# Patient Record
Sex: FEMALE | Race: WHITE | NOT HISPANIC OR LATINO | Employment: OTHER | URBAN - NONMETROPOLITAN AREA
[De-identification: names, ages, dates, MRNs, and addresses within clinical notes are randomized per-mention and may not be internally consistent; named-entity substitution may affect disease eponyms.]

---

## 2018-02-07 ENCOUNTER — HISTORICAL (OUTPATIENT)
Dept: ADMINISTRATIVE | Facility: HOSPITAL | Age: 73
End: 2018-02-07

## 2019-04-02 ENCOUNTER — HISTORICAL (OUTPATIENT)
Dept: ADMINISTRATIVE | Facility: HOSPITAL | Age: 74
End: 2019-04-02

## 2021-03-24 ENCOUNTER — HISTORICAL (OUTPATIENT)
Dept: ADMINISTRATIVE | Facility: HOSPITAL | Age: 76
End: 2021-03-24

## 2022-10-04 ENCOUNTER — HISTORICAL (OUTPATIENT)
Dept: ADMINISTRATIVE | Facility: HOSPITAL | Age: 77
End: 2022-10-04

## 2023-02-07 ENCOUNTER — OFFICE VISIT (OUTPATIENT)
Dept: FAMILY MEDICINE | Facility: CLINIC | Age: 78
End: 2023-02-07
Payer: MEDICARE

## 2023-02-07 VITALS
OXYGEN SATURATION: 98 % | SYSTOLIC BLOOD PRESSURE: 120 MMHG | HEART RATE: 72 BPM | DIASTOLIC BLOOD PRESSURE: 68 MMHG | WEIGHT: 229.5 LBS | TEMPERATURE: 98 F | BODY MASS INDEX: 46.27 KG/M2 | HEIGHT: 59 IN

## 2023-02-07 DIAGNOSIS — G62.9 NEUROPATHY: ICD-10-CM

## 2023-02-07 DIAGNOSIS — E66.01 MORBID OBESITY WITH BMI OF 45.0-49.9, ADULT: ICD-10-CM

## 2023-02-07 DIAGNOSIS — R60.0 LOWER EXTREMITY EDEMA: ICD-10-CM

## 2023-02-07 DIAGNOSIS — E78.5 HYPERLIPIDEMIA, UNSPECIFIED HYPERLIPIDEMIA TYPE: ICD-10-CM

## 2023-02-07 DIAGNOSIS — E66.9 DIABETES MELLITUS TYPE 2 IN OBESE: ICD-10-CM

## 2023-02-07 DIAGNOSIS — Z76.89 ENCOUNTER TO ESTABLISH CARE: Primary | ICD-10-CM

## 2023-02-07 DIAGNOSIS — I10 PRIMARY HYPERTENSION: ICD-10-CM

## 2023-02-07 DIAGNOSIS — F33.1 MODERATE EPISODE OF RECURRENT MAJOR DEPRESSIVE DISORDER: ICD-10-CM

## 2023-02-07 DIAGNOSIS — E11.69 DIABETES MELLITUS TYPE 2 IN OBESE: ICD-10-CM

## 2023-02-07 PROBLEM — Z13.29 SCREENING FOR THYROID DISORDER: Status: ACTIVE | Noted: 2023-02-07

## 2023-02-07 PROBLEM — Z13.220 SCREENING CHOLESTEROL LEVEL: Status: ACTIVE | Noted: 2023-02-07

## 2023-02-07 PROCEDURE — 3078F DIAST BP <80 MM HG: CPT | Mod: ,,, | Performed by: NURSE PRACTITIONER

## 2023-02-07 PROCEDURE — 99204 OFFICE O/P NEW MOD 45 MIN: CPT | Mod: ,,, | Performed by: NURSE PRACTITIONER

## 2023-02-07 PROCEDURE — 3074F SYST BP LT 130 MM HG: CPT | Mod: ,,, | Performed by: NURSE PRACTITIONER

## 2023-02-07 PROCEDURE — 1101F PR PT FALLS ASSESS DOC 0-1 FALLS W/OUT INJ PAST YR: ICD-10-PCS | Mod: ,,, | Performed by: NURSE PRACTITIONER

## 2023-02-07 PROCEDURE — 1160F PR REVIEW ALL MEDS BY PRESCRIBER/CLIN PHARMACIST DOCUMENTED: ICD-10-PCS | Mod: ,,, | Performed by: NURSE PRACTITIONER

## 2023-02-07 PROCEDURE — 3288F FALL RISK ASSESSMENT DOCD: CPT | Mod: ,,, | Performed by: NURSE PRACTITIONER

## 2023-02-07 PROCEDURE — 1159F MED LIST DOCD IN RCRD: CPT | Mod: ,,, | Performed by: NURSE PRACTITIONER

## 2023-02-07 PROCEDURE — 1160F RVW MEDS BY RX/DR IN RCRD: CPT | Mod: ,,, | Performed by: NURSE PRACTITIONER

## 2023-02-07 PROCEDURE — 3074F PR MOST RECENT SYSTOLIC BLOOD PRESSURE < 130 MM HG: ICD-10-PCS | Mod: ,,, | Performed by: NURSE PRACTITIONER

## 2023-02-07 PROCEDURE — 99204 PR OFFICE/OUTPT VISIT, NEW, LEVL IV, 45-59 MIN: ICD-10-PCS | Mod: ,,, | Performed by: NURSE PRACTITIONER

## 2023-02-07 PROCEDURE — 3288F PR FALLS RISK ASSESSMENT DOCUMENTED: ICD-10-PCS | Mod: ,,, | Performed by: NURSE PRACTITIONER

## 2023-02-07 PROCEDURE — 3078F PR MOST RECENT DIASTOLIC BLOOD PRESSURE < 80 MM HG: ICD-10-PCS | Mod: ,,, | Performed by: NURSE PRACTITIONER

## 2023-02-07 PROCEDURE — 1159F PR MEDICATION LIST DOCUMENTED IN MEDICAL RECORD: ICD-10-PCS | Mod: ,,, | Performed by: NURSE PRACTITIONER

## 2023-02-07 PROCEDURE — 1101F PT FALLS ASSESS-DOCD LE1/YR: CPT | Mod: ,,, | Performed by: NURSE PRACTITIONER

## 2023-02-07 RX ORDER — DULOXETIN HYDROCHLORIDE 60 MG/1
60 CAPSULE, DELAYED RELEASE ORAL DAILY
Qty: 90 CAPSULE | Refills: 3 | Status: SHIPPED | OUTPATIENT
Start: 2023-02-07 | End: 2023-05-08

## 2023-02-07 RX ORDER — METFORMIN HYDROCHLORIDE 1000 MG/1
1000 TABLET ORAL 2 TIMES DAILY
Qty: 180 TABLET | Refills: 3 | Status: SHIPPED | OUTPATIENT
Start: 2023-02-07 | End: 2023-05-08

## 2023-02-07 RX ORDER — GABAPENTIN 300 MG/1
300 CAPSULE ORAL DAILY
Qty: 30 CAPSULE | Refills: 3 | Status: SHIPPED | OUTPATIENT
Start: 2023-02-07

## 2023-02-07 RX ORDER — BUMETANIDE 1 MG/1
1 TABLET ORAL DAILY
COMMUNITY
Start: 2022-12-29 | End: 2023-02-07 | Stop reason: SDUPTHER

## 2023-02-07 RX ORDER — DULOXETIN HYDROCHLORIDE 60 MG/1
60 CAPSULE, DELAYED RELEASE ORAL DAILY
COMMUNITY
Start: 2022-12-29 | End: 2023-02-07 | Stop reason: SDUPTHER

## 2023-02-07 RX ORDER — HUMAN INSULIN 100 [USP'U]/ML
20 INJECTION, SUSPENSION SUBCUTANEOUS 2 TIMES DAILY
COMMUNITY
Start: 2022-12-27 | End: 2023-02-24 | Stop reason: SDUPTHER

## 2023-02-07 RX ORDER — AMLODIPINE BESYLATE 5 MG/1
5 TABLET ORAL DAILY
COMMUNITY
Start: 2022-12-27 | End: 2023-02-07 | Stop reason: SDUPTHER

## 2023-02-07 RX ORDER — CARVEDILOL 6.25 MG/1
6.25 TABLET ORAL 2 TIMES DAILY
COMMUNITY
Start: 2022-12-29 | End: 2023-02-07 | Stop reason: SDUPTHER

## 2023-02-07 RX ORDER — NAPROXEN SODIUM 220 MG/1
81 TABLET, FILM COATED ORAL DAILY
COMMUNITY

## 2023-02-07 RX ORDER — GABAPENTIN 300 MG/1
300 CAPSULE ORAL DAILY
COMMUNITY
Start: 2022-12-30 | End: 2023-02-07 | Stop reason: SDUPTHER

## 2023-02-07 RX ORDER — AMLODIPINE BESYLATE 5 MG/1
5 TABLET ORAL DAILY
Qty: 90 TABLET | Refills: 3 | Status: SHIPPED | OUTPATIENT
Start: 2023-02-07 | End: 2023-05-08

## 2023-02-07 RX ORDER — BUMETANIDE 1 MG/1
1 TABLET ORAL DAILY
Qty: 90 TABLET | Refills: 3 | Status: SHIPPED | OUTPATIENT
Start: 2023-02-07 | End: 2023-05-08

## 2023-02-07 RX ORDER — CARVEDILOL 6.25 MG/1
6.25 TABLET ORAL 2 TIMES DAILY
Qty: 180 TABLET | Refills: 3 | Status: SHIPPED | OUTPATIENT
Start: 2023-02-07 | End: 2023-05-08

## 2023-02-07 RX ORDER — METFORMIN HYDROCHLORIDE 1000 MG/1
1000 TABLET ORAL 2 TIMES DAILY
COMMUNITY
Start: 2022-12-27 | End: 2023-02-07 | Stop reason: SDUPTHER

## 2023-02-07 NOTE — PROGRESS NOTES
Patient ID: Princess Florentino is a 77 y.o. female.    Chief Complaint: Establish Care                     History of Present Illness:  The patient is 77 y.o. White female for evaluation and management with a chief complaint of Establish Care .  Previously followed by Dr. Ardon.  Diagnosed with type 2 diabetes at age 58.  She reports that her most recent hemoglobin A1c was 7.5 about 3 months ago.  Her fasting blood sugars average .  She also complains of neuropathic pain to bilateral feet and hands managed with gabapentin daily.    Diagnosed with major depressive disorder about 5-6 years ago.  She is been on certain in the past but no complaints today on duloxetine 60 mg daily.  She has also taken sertraline in the past.  Sleeping well.    She was also diagnosed with hypertension at age 58.  She was on ACE inhibitors in the past but these caused angioedema.  She does not check her blood pressures at home but reports they are well controlled at office visits.  She was also on a statin in the past for hyperlipidemia.  She is unsure of exactly why it was stopped but thinks it was secondary to constipation.  She takes Bumex as needed for lower extremity edema.  Previously followed by Dr. Diaz on and Dr. Stroud for Cardiology but no current cardiologist.  She reports that congestive heart failure was considered but echocardiogram was normal.    Lives at home alone.  No recent falls.      Review of Systems   Constitutional:  Negative for activity change, appetite change, fatigue and fever.   HENT:  Negative for ear pain, hearing loss and trouble swallowing.    Eyes:  Negative for pain and visual disturbance.   Respiratory:  Negative for cough, chest tightness and shortness of breath.    Cardiovascular:  Positive for leg swelling. Negative for chest pain, palpitations and claudication.   Gastrointestinal:  Negative for abdominal pain, blood in stool, change in bowel habit, constipation, diarrhea, nausea, vomiting and  "change in bowel habit.   Endocrine: Negative for cold intolerance, heat intolerance, polydipsia, polyphagia and polyuria.   Genitourinary:  Negative for dysuria, frequency and hematuria.   Musculoskeletal:  Negative for gait problem and joint swelling.   Integumentary:  Negative for rash and mole/lesion.   Allergic/Immunologic: Negative for immunocompromised state.   Neurological:  Negative for dizziness, seizures, weakness, headaches and memory loss.   Psychiatric/Behavioral:  Negative for confusion and sleep disturbance. The patient is not nervous/anxious.        Past Medical History:  has a past medical history of Arthritis, Depression, Diabetes mellitus, type 2, Heart failure, unspecified, and Hypertension.    Current Outpatient Medications   Medication Instructions    amLODIPine (NORVASC) 5 mg, Oral, Daily    aspirin 81 mg, Oral, Daily    bumetanide (BUMEX) 1 mg, Oral, Daily    carvediloL (COREG) 6.25 mg, Oral, 2 times daily    DULoxetine (CYMBALTA) 60 mg, Oral, Daily    gabapentin (NEURONTIN) 300 mg, Oral, Daily    metFORMIN (GLUCOPHAGE) 1,000 mg, Oral, 2 times daily    NOVOLIN 70/30 U-100 INSULIN 100 unit/mL (70-30) injection 10 mLs, Subcutaneous, 2 times daily       Patient is allergic to ace inhibitors, benazepril, duricef [cefadroxil], iodine, omnipen, penicillins, and vistaril [hydroxyzine hcl].       Visit Vitals  /68 (BP Location: Right arm)   Pulse 72   Temp 98.1 °F (36.7 °C) (Oral)   Ht 4' 11.06" (1.5 m)   Wt 104.1 kg (229 lb 8 oz)   SpO2 98%   BMI 46.27 kg/m²         Physical Exam  Constitutional:       Appearance: Normal appearance. She is obese.   HENT:      Head: Normocephalic.      Right Ear: External ear normal.      Left Ear: External ear normal.      Mouth/Throat:      Mouth: Mucous membranes are moist.      Pharynx: Oropharynx is clear.   Eyes:      General: No scleral icterus.     Extraocular Movements: Extraocular movements intact.      Conjunctiva/sclera: Conjunctivae normal.      " Pupils: Pupils are equal, round, and reactive to light.   Cardiovascular:      Rate and Rhythm: Normal rate and regular rhythm.      Heart sounds: Normal heart sounds.      Comments: Nonpitting edema to bilateral lower extremities   Pulmonary:      Effort: Pulmonary effort is normal.      Breath sounds: Normal breath sounds.   Abdominal:      General: Bowel sounds are normal.      Palpations: Abdomen is soft.   Musculoskeletal:         General: Normal range of motion.      Cervical back: Normal range of motion and neck supple.   Skin:     General: Skin is warm and dry.   Neurological:      Mental Status: She is alert and oriented to person, place, and time. Mental status is at baseline.   Psychiatric:         Mood and Affect: Mood normal.         Thought Content: Thought content normal.         Judgment: Judgment normal.         Assessment/Plan:    ICD-10-CM ICD-9-CM   1. Encounter to establish care  Z76.89 V65.8   2. Diabetes mellitus type 2 in obese  E11.69 250.00    E66.9 278.00   3. Primary hypertension  I10 401.9   4. Morbid obesity with BMI of 45.0-49.9, adult  E66.01 278.01    Z68.42 V85.42   5. Hyperlipidemia, unspecified hyperlipidemia type  E78.5 272.4   6. Lower extremity edema  R60.0 782.3   7. Neuropathy  G62.9 355.9   8. Moderate episode of recurrent major depressive disorder  F33.1 296.32   9. Screening for thyroid disorder  Z13.29 V77.0       1. Encounter to establish care  Obtain records from Dr. Ardon, release signed    2. Diabetes mellitus type 2 in obese  Continue metformin a 1000 mg twice per day and Novolin 30 units in the morning and 20 units in the evening    - Hemoglobin A1C; Future  - TSH; Future    3. Primary hypertension  Well controlled today   Continue amlodipine 5 mg daily    - CBC Auto Differential; Future  - Comprehensive Metabolic Panel; Future    4. Morbid obesity with BMI of 45.0-49.9, adult  Discussed initiating GLP 1 at next visit after labs are complete and reviewed    5.  Hyperlipidemia, unspecified hyperlipidemia type  Obtain FLP today  She reports constipation with statins in the past    - Lipid Panel; Future    6. Lower extremity edema  Obtain records from PCP and recent echocardiogram   Low-sodium diet, elevation of extremities, and continue Bumex 1 mg as needed    7. Neuropathy  Refill gabapentin    8. Moderate episode of recurrent major depressive disorder  No complaints at this time   Continue duloxetine 60 mg daily          Follow up in about 4 weeks (around 3/7/2023) for lab f/u, With Fasting Labs Prior. or sooner as needed.    Future Appointments   Date Time Provider Department Center   2/28/2023 10:00 AM LAB, [facility] LABORATORY DRAW [facility] KEO Ricks   3/7/2023 10:00 AM VICKIE Monzon FNP

## 2023-02-22 ENCOUNTER — DOCUMENTATION ONLY (OUTPATIENT)
Dept: FAMILY MEDICINE | Facility: CLINIC | Age: 78
End: 2023-02-22
Payer: MEDICARE

## 2023-02-22 LAB — CRC RECOMMENDATION EXT: NORMAL

## 2023-02-24 ENCOUNTER — PATIENT MESSAGE (OUTPATIENT)
Dept: FAMILY MEDICINE | Facility: CLINIC | Age: 78
End: 2023-02-24
Payer: MEDICARE

## 2023-02-24 ENCOUNTER — TELEPHONE (OUTPATIENT)
Dept: FAMILY MEDICINE | Facility: CLINIC | Age: 78
End: 2023-02-24
Payer: MEDICARE

## 2023-02-24 DIAGNOSIS — E66.9 DIABETES MELLITUS TYPE 2 IN OBESE: Primary | ICD-10-CM

## 2023-02-24 DIAGNOSIS — E11.69 DIABETES MELLITUS TYPE 2 IN OBESE: Primary | ICD-10-CM

## 2023-02-24 RX ORDER — HUMAN INSULIN 100 [USP'U]/ML
INJECTION, SUSPENSION SUBCUTANEOUS
Qty: 20 ML | Refills: 11 | Status: SHIPPED | OUTPATIENT
Start: 2023-02-24

## 2023-02-28 PROCEDURE — 80061 LIPID PANEL: CPT | Performed by: NURSE PRACTITIONER

## 2023-02-28 PROCEDURE — 84443 ASSAY THYROID STIM HORMONE: CPT | Performed by: NURSE PRACTITIONER

## 2023-02-28 PROCEDURE — 80053 COMPREHEN METABOLIC PANEL: CPT | Performed by: NURSE PRACTITIONER

## 2023-02-28 PROCEDURE — 83036 HEMOGLOBIN GLYCOSYLATED A1C: CPT | Performed by: NURSE PRACTITIONER

## 2023-02-28 PROCEDURE — 85025 COMPLETE CBC W/AUTO DIFF WBC: CPT | Performed by: NURSE PRACTITIONER

## 2023-03-07 ENCOUNTER — OFFICE VISIT (OUTPATIENT)
Dept: FAMILY MEDICINE | Facility: CLINIC | Age: 78
End: 2023-03-07
Payer: MEDICARE

## 2023-03-07 VITALS
HEIGHT: 59 IN | WEIGHT: 228.81 LBS | OXYGEN SATURATION: 99 % | SYSTOLIC BLOOD PRESSURE: 120 MMHG | BODY MASS INDEX: 46.13 KG/M2 | DIASTOLIC BLOOD PRESSURE: 72 MMHG | HEART RATE: 66 BPM | TEMPERATURE: 98 F

## 2023-03-07 DIAGNOSIS — G47.19 DAYTIME HYPERSOMNOLENCE: ICD-10-CM

## 2023-03-07 DIAGNOSIS — E66.9 DIABETES MELLITUS TYPE 2 IN OBESE: Primary | ICD-10-CM

## 2023-03-07 DIAGNOSIS — I10 PRIMARY HYPERTENSION: ICD-10-CM

## 2023-03-07 DIAGNOSIS — E78.5 HYPERLIPIDEMIA, UNSPECIFIED HYPERLIPIDEMIA TYPE: ICD-10-CM

## 2023-03-07 DIAGNOSIS — E11.69 DIABETES MELLITUS TYPE 2 IN OBESE: Primary | ICD-10-CM

## 2023-03-07 DIAGNOSIS — E66.01 MORBID OBESITY DUE TO EXCESS CALORIES: ICD-10-CM

## 2023-03-07 DIAGNOSIS — I25.10 CORONARY ARTERY DISEASE INVOLVING NATIVE HEART, UNSPECIFIED VESSEL OR LESION TYPE, UNSPECIFIED WHETHER ANGINA PRESENT: ICD-10-CM

## 2023-03-07 DIAGNOSIS — G47.33 OBSTRUCTIVE SLEEP APNEA: ICD-10-CM

## 2023-03-07 PROBLEM — G62.9 POLYNEUROPATHY: Status: ACTIVE | Noted: 2023-03-07

## 2023-03-07 PROBLEM — F33.0 MILD EPISODE OF RECURRENT MAJOR DEPRESSIVE DISORDER: Status: ACTIVE | Noted: 2023-03-07

## 2023-03-07 PROBLEM — Z13.220 SCREENING CHOLESTEROL LEVEL: Status: RESOLVED | Noted: 2023-02-07 | Resolved: 2023-03-07

## 2023-03-07 PROCEDURE — 99214 PR OFFICE/OUTPT VISIT, EST, LEVL IV, 30-39 MIN: ICD-10-PCS | Mod: ,,, | Performed by: NURSE PRACTITIONER

## 2023-03-07 PROCEDURE — 1160F PR REVIEW ALL MEDS BY PRESCRIBER/CLIN PHARMACIST DOCUMENTED: ICD-10-PCS | Mod: ,,, | Performed by: NURSE PRACTITIONER

## 2023-03-07 PROCEDURE — 1160F RVW MEDS BY RX/DR IN RCRD: CPT | Mod: ,,, | Performed by: NURSE PRACTITIONER

## 2023-03-07 PROCEDURE — 3078F PR MOST RECENT DIASTOLIC BLOOD PRESSURE < 80 MM HG: ICD-10-PCS | Mod: ,,, | Performed by: NURSE PRACTITIONER

## 2023-03-07 PROCEDURE — 1159F PR MEDICATION LIST DOCUMENTED IN MEDICAL RECORD: ICD-10-PCS | Mod: ,,, | Performed by: NURSE PRACTITIONER

## 2023-03-07 PROCEDURE — 3078F DIAST BP <80 MM HG: CPT | Mod: ,,, | Performed by: NURSE PRACTITIONER

## 2023-03-07 PROCEDURE — 1101F PT FALLS ASSESS-DOCD LE1/YR: CPT | Mod: ,,, | Performed by: NURSE PRACTITIONER

## 2023-03-07 PROCEDURE — 3074F PR MOST RECENT SYSTOLIC BLOOD PRESSURE < 130 MM HG: ICD-10-PCS | Mod: ,,, | Performed by: NURSE PRACTITIONER

## 2023-03-07 PROCEDURE — 3288F FALL RISK ASSESSMENT DOCD: CPT | Mod: ,,, | Performed by: NURSE PRACTITIONER

## 2023-03-07 PROCEDURE — 99214 OFFICE O/P EST MOD 30 MIN: CPT | Mod: ,,, | Performed by: NURSE PRACTITIONER

## 2023-03-07 PROCEDURE — 1101F PR PT FALLS ASSESS DOC 0-1 FALLS W/OUT INJ PAST YR: ICD-10-PCS | Mod: ,,, | Performed by: NURSE PRACTITIONER

## 2023-03-07 PROCEDURE — 3074F SYST BP LT 130 MM HG: CPT | Mod: ,,, | Performed by: NURSE PRACTITIONER

## 2023-03-07 PROCEDURE — 3288F PR FALLS RISK ASSESSMENT DOCUMENTED: ICD-10-PCS | Mod: ,,, | Performed by: NURSE PRACTITIONER

## 2023-03-07 PROCEDURE — 1159F MED LIST DOCD IN RCRD: CPT | Mod: ,,, | Performed by: NURSE PRACTITIONER

## 2023-03-07 RX ORDER — ROSUVASTATIN CALCIUM 5 MG/1
5 TABLET, COATED ORAL EVERY OTHER DAY
Qty: 45 TABLET | Refills: 3 | Status: SHIPPED | OUTPATIENT
Start: 2023-03-07 | End: 2024-03-06

## 2023-03-07 NOTE — PROGRESS NOTES
"Patient ID: Princess Florentino is a 77 y.o. female.    Chief Complaint: Hypertension, Depression, Diabetes, Arthritis, blood work , and Follow-up                     History of Present Illness:  The patient is 77 y.o. White female for evaluation and management with a chief complaint of Hypertension, Depression, Diabetes, Arthritis, blood work , and Follow-up .  Fasting blood sugars around 150 but little elevated closer to 200 after running out of her insulin for the past 5 days.  She previously established with Dr. Diaz in [place] for cardiology.  She has not seen him in the past few years due to transportation issues.  She reports 40% blockage "across my heart." She complains of intermittent pain to the left side of her chest.  May occur with activity or at rest.  Pain usually present for about 15 minutes and then resolves without intervention.  She recently began to experience palpitations with exertion.  Palpitations usually improve with rest.  Occur a few times per week.  She also reports questionable history of congestive heart failure with lower extremity edema.  She takes Bumex as needed for the edema with some improvement.  Diagnosed with obstructive sleep apnea several years ago but is no longer using CPAP.  She is very fatigued and sluggish during the day.  Falls asleep several times during the day.     ROS: See HPI      Past Medical History:  has a past medical history of Arthritis, Depression, Diabetes mellitus, type 2, and Hypertension.    Current Outpatient Medications   Medication Instructions    amLODIPine (NORVASC) 5 mg, Oral, Daily    aspirin 81 mg, Oral, Daily    bumetanide (BUMEX) 1 mg, Oral, Daily    carvediloL (COREG) 6.25 mg, Oral, 2 times daily    DULoxetine (CYMBALTA) 60 mg, Oral, Daily    empagliflozin (JARDIANCE) 10 mg, Oral, Daily    gabapentin (NEURONTIN) 300 mg, Oral, Daily    metFORMIN (GLUCOPHAGE) 1,000 mg, Oral, 2 times daily    NOVOLIN 70/30 U-100 INSULIN 100 unit/mL (70-30) " "injection Inject 30 Units into the skin once daily AND 20 Units every evening.    rosuvastatin (CRESTOR) 5 mg, Oral, Every other day       Patient is allergic to ace inhibitors, benazepril, duricef [cefadroxil], iodine, omnipen, penicillins, and vistaril [hydroxyzine hcl].       Visit Vitals  /72 (BP Location: Right arm)   Pulse 66   Temp 98.1 °F (36.7 °C) (Temporal)   Ht 4' 11.06" (1.5 m)   Wt 103.8 kg (228 lb 12.8 oz)   SpO2 99%   BMI 46.12 kg/m²         Physical Exam  Vitals reviewed.   Constitutional:       Appearance: Normal appearance. She is obese.   Cardiovascular:      Rate and Rhythm: Normal rate and regular rhythm.      Heart sounds: Murmur (Systolic murmur) heard.   Pulmonary:      Effort: Pulmonary effort is normal.      Breath sounds: Normal breath sounds.   Abdominal:      General: Bowel sounds are normal.      Palpations: Abdomen is soft.      Tenderness: There is no abdominal tenderness.   Musculoskeletal:      Cervical back: Normal range of motion and neck supple.      Right lower leg: Edema present.      Left lower leg: Edema present.   Skin:     General: Skin is warm and dry.   Neurological:      Mental Status: She is alert.   Psychiatric:         Mood and Affect: Mood normal.         Behavior: Behavior normal.         Thought Content: Thought content normal.         Judgment: Judgment normal.         1. Diabetes mellitus type 2 in obese  Hemoglobin A1c 7.5   Continue metformin a 1000 mg daily, decreased 70 30 insulin to 15 units in the morning and 10 units in the evening   Add Jardiance 10 mg daily  Instructed to report any issues with hypoglycemia to office  Check blood sugar twice a day, record, and bring log to next visit  On Statin according to guidelines.  Follow ADA Diet. Avoid soda, simple sweets, and limit rice/pasta/breads/starches (no more than 45-50 grams per meal).  Maintain healthy weight with goal BMI <30.  Exercise 5 times per week for 30 minutes per day.  Stressed " importance of daily foot exams.  Educated on importance of annual dilated eye exam.    2. Primary hypertension  Well controlled today   BUN 12, creatinine 0.63, EGFR 90  Continue amlodipine 5 mg daily  Angioedema with ACE inhibitors      3. Coronary artery disease involving native heart, unspecified vessel or lesion type, unspecified whether angina present  Refer to [facility] for evaluation and treatment  Release of records signed for Dr. Diaz     4. Obstructive sleep apnea  - Home Sleep Study; Future  Discussed effects of untreated sleep apnea     5. Daytime hypersomnolence    6. Morbid obesity due to excess calories  Body mass index is 46.12 kg/m².  Goal BMI <30.  Exercise 5 times a week for 30 minutes per day.  Avoid soda, simple sugars, excessive rice, potatoes or bread. Limit fast foods and fried foods.  Choose complex carbs in moderation (example: green vegetables, beans,      7. Hyperlipidemia, unspecified hyperlipidemia type  Total cholesterol 193, HDL 52, triglycerides 169,    Initiate rosuvastatin 5 mg every other night due to diabetes and CAD         Follow up in about 2 weeks (around 3/21/2023) for DM with log . or sooner as needed.    Future Appointments   Date Time Provider Department Center   3/27/2023  2:30 PM VICKIE Monzon FNP

## 2023-03-27 ENCOUNTER — TELEPHONE (OUTPATIENT)
Dept: FAMILY MEDICINE | Facility: CLINIC | Age: 78
End: 2023-03-27

## 2023-03-27 NOTE — TELEPHONE ENCOUNTER
Please cancel her appointment for this afternoon. She does not feel comfortable with female provider. Offered for her to keep appointment until she found another PCP but patient refused.

## 2023-12-14 ENCOUNTER — HOSPITAL ENCOUNTER (EMERGENCY)
Facility: HOSPITAL | Age: 78
Discharge: HOME | End: 2023-12-14
Payer: MEDICARE

## 2023-12-14 VITALS
WEIGHT: 208 LBS | OXYGEN SATURATION: 98 % | SYSTOLIC BLOOD PRESSURE: 137 MMHG | TEMPERATURE: 98 F | RESPIRATION RATE: 18 BRPM | BODY MASS INDEX: 41.93 KG/M2 | DIASTOLIC BLOOD PRESSURE: 68 MMHG | HEIGHT: 59 IN | HEART RATE: 71 BPM

## 2023-12-14 DIAGNOSIS — N39.0 URINARY TRACT INFECTION WITH HEMATURIA, SITE UNSPECIFIED: Primary | ICD-10-CM

## 2023-12-14 DIAGNOSIS — R31.9 URINARY TRACT INFECTION WITH HEMATURIA, SITE UNSPECIFIED: Primary | ICD-10-CM

## 2023-12-14 LAB
APPEARANCE UR: ABNORMAL
BACTERIA #/AREA URNS AUTO: ABNORMAL /HPF
BILIRUB UR QL STRIP.AUTO: NEGATIVE
COLOR UR AUTO: YELLOW
GLUCOSE UR QL STRIP.AUTO: >=1000
KETONES UR QL STRIP.AUTO: NEGATIVE
LEUKOCYTE ESTERASE UR QL STRIP.AUTO: NEGATIVE
NITRITE UR QL STRIP.AUTO: NEGATIVE
PH UR STRIP.AUTO: 6 [PH]
PROT UR QL STRIP.AUTO: ABNORMAL
RBC #/AREA URNS AUTO: ABNORMAL /HPF
RBC UR QL AUTO: ABNORMAL
SP GR UR STRIP.AUTO: 1.01 (ref 1–1.03)
SQUAMOUS #/AREA URNS AUTO: ABNORMAL /HPF
UROBILINOGEN UR STRIP-ACNC: 0.2
WBC #/AREA URNS AUTO: ABNORMAL /HPF

## 2023-12-14 PROCEDURE — 87086 URINE CULTURE/COLONY COUNT: CPT | Performed by: NURSE PRACTITIONER

## 2023-12-14 PROCEDURE — 81001 URINALYSIS AUTO W/SCOPE: CPT | Performed by: NURSE PRACTITIONER

## 2023-12-14 PROCEDURE — 99283 EMERGENCY DEPT VISIT LOW MDM: CPT

## 2023-12-14 RX ORDER — DOXYCYCLINE 100 MG/1
100 CAPSULE ORAL 2 TIMES DAILY
Qty: 20 CAPSULE | Refills: 0 | Status: SHIPPED | OUTPATIENT
Start: 2023-12-14 | End: 2023-12-24

## 2023-12-14 NOTE — ED PROVIDER NOTES
Encounter Date: 12/14/2023       History     Chief Complaint   Patient presents with    Dysuria     Pt. Reports bladder fullness and Dysuria for pass 32 days. Denies Flank/back pain. Denies N/V, Fever.      Pt. Reports bladder fullness and Dysuria for pass 32 days. Denies Flank/back pain. Denies N/V, Fever.         Review of patient's allergies indicates:   Allergen Reactions    Ace inhibitors Swelling    Benazepril Swelling    Duricef [cefadroxil] Swelling    Iodine Swelling    Omnipen Swelling    Penicillins Swelling    Vistaril [hydroxyzine hcl] Swelling     Past Medical History:   Diagnosis Date    Arthritis     Depression     Diabetes mellitus, type 2     Hypertension      Past Surgical History:   Procedure Laterality Date    APPENDECTOMY      EYE SURGERY      HYSTERECTOMY      TOTAL KNEE ARTHROPLASTY       Family History   Problem Relation Age of Onset    Heart attack Mother     Hypertension Mother     Heart failure Mother     Diabetes type II Mother     Inflammatory bowel disease Father      Social History     Tobacco Use    Smoking status: Never    Smokeless tobacco: Never   Substance Use Topics    Alcohol use: Never    Drug use: Never     Review of Systems   Genitourinary:  Positive for dysuria.        Bladder fullness   All other systems reviewed and are negative.      Physical Exam     Initial Vitals   BP Pulse Resp Temp SpO2   12/14/23 1427 12/14/23 1427 12/14/23 1526 12/14/23 1427 12/14/23 1427   133/67 66 18 98.2 °F (36.8 °C) 99 %      MAP       --                Physical Exam    Nursing note and vitals reviewed.  Constitutional: She appears well-developed and well-nourished.   HENT:   Head: Normocephalic and atraumatic.   Neck:   Normal range of motion.  Cardiovascular:  Normal rate.           Pulmonary/Chest: Breath sounds normal.   Musculoskeletal:         General: Normal range of motion.      Cervical back: Normal range of motion.     Neurological: She is alert and oriented to person, place, and  time.   Psychiatric: She has a normal mood and affect. Her behavior is normal. Judgment and thought content normal.         ED Course   Procedures  Labs Reviewed   URINALYSIS - Abnormal; Notable for the following components:       Result Value    Appearance, UA Cloudy (*)     Protein, UA Trace (*)     Glucose, UA >=1000 (*)     Blood, UA Small (*)     All other components within normal limits    Narrative:      URINE STABILITY IS 2 HOURS AT ROOM TEMP OR    SIX HOURS REFRIGERATED. PERFORMING TESTING ON    SPECIMENS GREATER THAN THIS AGE MAY AFFECT THE    FOLLOWING TESTS:    PH          SPECIFIC GRAVITY           BLOOD    CLARITY     BILIRUBIN               UROBILINOGEN   URINALYSIS, MICROSCOPIC - Abnormal; Notable for the following components:    Bacteria, UA Few (*)     WBC, UA 50-99 (*)     All other components within normal limits   CULTURE, URINE          Imaging Results    None          Medications - No data to display  Medical Decision Making  Problems Addressed:  Urinary tract infection with hematuria, site unspecified: acute illness or injury    Amount and/or Complexity of Data Reviewed  Labs: ordered.    Risk  Prescription drug management.                                      Clinical Impression:  Final diagnoses:  [N39.0, R31.9] Urinary tract infection with hematuria, site unspecified (Primary)          ED Disposition Condition    Discharge Stable          ED Prescriptions       Medication Sig Dispense Start Date End Date Auth. Provider    doxycycline (VIBRAMYCIN) 100 MG Cap Take 1 capsule (100 mg total) by mouth 2 (two) times daily. for 10 days 20 capsule 12/14/2023 12/24/2023 Adri Clemons FNP          Follow-up Information       Follow up With Specialties Details Why Contact Info    pcp  Call  As needed              Adri Clemons FNP  12/14/23 1600

## 2023-12-17 LAB
BACTERIA UR CULT: ABNORMAL

## 2023-12-31 ENCOUNTER — HOSPITAL ENCOUNTER (EMERGENCY)
Facility: HOSPITAL | Age: 78
Discharge: HOME | End: 2023-12-31
Attending: FAMILY MEDICINE
Payer: MEDICARE

## 2023-12-31 VITALS
DIASTOLIC BLOOD PRESSURE: 87 MMHG | TEMPERATURE: 98 F | HEART RATE: 70 BPM | BODY MASS INDEX: 41.41 KG/M2 | RESPIRATION RATE: 18 BRPM | HEIGHT: 62 IN | SYSTOLIC BLOOD PRESSURE: 172 MMHG | WEIGHT: 225 LBS | OXYGEN SATURATION: 96 %

## 2023-12-31 DIAGNOSIS — T14.90XA TRAUMA: ICD-10-CM

## 2023-12-31 DIAGNOSIS — S80.02XA CONTUSION OF LEFT KNEE, INITIAL ENCOUNTER: ICD-10-CM

## 2023-12-31 DIAGNOSIS — S00.93XA CONTUSION OF HEAD, UNSPECIFIED PART OF HEAD, INITIAL ENCOUNTER: Primary | ICD-10-CM

## 2023-12-31 PROCEDURE — 25000003 PHARM REV CODE 250: Performed by: FAMILY MEDICINE

## 2023-12-31 PROCEDURE — 99284 EMERGENCY DEPT VISIT MOD MDM: CPT | Mod: 25

## 2023-12-31 RX ORDER — TRAMADOL HYDROCHLORIDE 50 MG/1
50 TABLET ORAL
Status: COMPLETED | OUTPATIENT
Start: 2023-12-31 | End: 2023-12-31

## 2023-12-31 RX ORDER — TRAMADOL HYDROCHLORIDE 50 MG/1
50 TABLET ORAL EVERY 6 HOURS PRN
Qty: 15 TABLET | Refills: 0 | Status: SHIPPED | OUTPATIENT
Start: 2023-12-31

## 2023-12-31 RX ADMIN — TRAMADOL HYDROCHLORIDE 50 MG: 50 TABLET, COATED ORAL at 10:12

## 2024-01-01 NOTE — ED PROVIDER NOTES
Encounter Date: 12/31/2023       History     Chief Complaint   Patient presents with    Fall     Pt. Reports mechanical fall. With L Forehead, L nose and L Knee pain..  Denies LOC, Blood thinner and AMS. Hematoma noted to L Forehead     Patient tripped at home, struck her forehead on door frame.  She hit her left knee on the ground.  No loss of consciousness, no nausea vomiting.    The history is provided by the patient.     Review of patient's allergies indicates:   Allergen Reactions    Ace inhibitors Swelling    Benazepril Swelling    Duricef [cefadroxil] Swelling    Iodine Swelling    Omnipen Swelling    Penicillins Swelling    Vistaril [hydroxyzine hcl] Swelling     Past Medical History:   Diagnosis Date    Arthritis     Depression     Diabetes mellitus, type 2     Hypertension      Past Surgical History:   Procedure Laterality Date    APPENDECTOMY      EYE SURGERY      HYSTERECTOMY      TOTAL KNEE ARTHROPLASTY       Family History   Problem Relation Age of Onset    Heart attack Mother     Hypertension Mother     Heart failure Mother     Diabetes type II Mother     Inflammatory bowel disease Father      Social History     Tobacco Use    Smoking status: Never    Smokeless tobacco: Never   Substance Use Topics    Alcohol use: Never    Drug use: Never     Review of Systems   Constitutional:  Negative for fever.   HENT:  Negative for sore throat.    Respiratory:  Negative for shortness of breath.    Cardiovascular:  Negative for chest pain.   Gastrointestinal:  Negative for nausea.   Genitourinary:  Negative for dysuria.   Musculoskeletal:  Negative for back pain.   Skin:  Negative for rash.   Neurological:  Negative for weakness.   Hematological:  Does not bruise/bleed easily.   All other systems reviewed and are negative.      Physical Exam     Initial Vitals [12/31/23 2119]   BP Pulse Resp Temp SpO2   (!) 155/87 80 19 98.4 °F (36.9 °C) 99 %      MAP       --         Physical Exam    Nursing note and vitals  reviewed.  Constitutional: She appears well-developed and well-nourished.   HENT:   Left periorbital edema and ecchymosis noted scant blood on her right nares with some nasal swelling    Left side of nose with small abrasion   Eyes: EOM are normal. Pupils are equal, round, and reactive to light.   Neck: Neck supple.   Normal range of motion.  Cardiovascular:  Normal rate, regular rhythm and normal heart sounds.           Pulmonary/Chest: Breath sounds normal.   Abdominal: Abdomen is soft. Bowel sounds are normal. There is no abdominal tenderness.   Musculoskeletal:         General: No edema. Normal range of motion.      Cervical back: Normal range of motion and neck supple.     Neurological: She is alert and oriented to person, place, and time.   Skin: Skin is warm and dry. Capillary refill takes less than 2 seconds.   Psychiatric: She has a normal mood and affect.         ED Course   Procedures  Labs Reviewed - No data to display       Imaging Results              CT Maxillofacial Without Contrast (Preliminary result)  Result time 12/31/23 22:21:17      Preliminary result by Garett Siddiqui MD (12/31/23 22:21:17)                   Narrative:    START OF REPORT:  Technique: Noncontrast maxillofacial CT was performed with axial as well as sagittal and coronal images being submitted for interpretation.    Comparison: None.    Clinical history: PT FELL TODAY - HEMATOMA TO LT FOREHEAD.    Findings:  Orbital soft tissues: The intraorbital soft tissues appear unremarkable. Pronounced left supraobirtal and preseptal soft tissue swelling is seen.  Bones:  Orbital bony structures: The bilateral orbital bony structures are intact with no orbital fracture identified.  Mandible: The mandible appears unremarkable with no fracture identified.  Maxilla: The maxilla appears unremarkable with no fracture identified.  Pterygoid plates: No fracture identified of the right or left pterygoid plates.  Zygoma: The zygomatic arches are  intact with no zygomatic complex fracture identified.  TMJ: The mandibular condyles appear normally placed with respect to the mandibular fossa.  Nasal Bones: No displaced nasal bone fracture is seen.  Paranasal sinuses: The visualized paranasal sinuses appear clear with no mucoperiosteal thickening or air fluid levels identified.  Mastoid air cells: There is fluid opacification of a few right inferior mastoid air cells. This may be due to effusion.  Brain: Intracranial findings discussed separately.      Impression:  1. Pronounced left supraobirtal and preseptal soft tissue swelling is seen.  2. No acute maxillofacial fracture identified. Details and other findings as noted above.                                         CT Head Without Contrast (Preliminary result)  Result time 12/31/23 22:20:14      Preliminary result by Garett Siddiqui MD (12/31/23 22:20:14)                   Narrative:    START OF REPORT:  Technique: CT of the head was performed without intravenous contrast with axial as well as coronal and sagittal images.    Comparison: None.    Dosage Information: Automated exposure control was utilized.    Clinical history: PT FELL TODAY - HEMATOMA TO LT FOREHEAD.    Findings:  Hemorrhage: No acute intracranial hemorrhage is seen.  CSF spaces: The ventricles, sulci and basal cisterns all appear moderately prominent consistent with global cerebral atrophy.  Brain parenchyma: There is preservation of the grey white junction throughout. Mild microvascular change is seen in portions of the periventricular and deep white matter tracts.  Cerebellum: Unremarkable.  Sella and skull base: The sella appears to be within normal limits for age.  Herniation: None.  Intracranial calcifications: Incidental note is made of bilateral choroid plexus calcification. Incidental note is made of some pineal region calcification. Incidental note is made of some calcification of the falx. Incidental note is made of subtle right  basal ganglia calcifcation.  Calvarium: No acute linear or depressed skull fracture is seen.    Maxillofacial Structures: Maxillofacial findings discussed separately in the maxillofacial CT report.      Impression:  1. No acute intracranial traumatic injury identified. Details and findings as noted above.                                         X-Ray Knee 3 View Left (In process)                      Medications   traMADoL tablet 50 mg (has no administration in time range)     Medical Decision Making  CT brain no intracranial problems, CT maxillofacial no fractures, left knee no fractures hardware in place and intact    Amount and/or Complexity of Data Reviewed  Radiology: ordered.    Risk  Prescription drug management.                                      Clinical Impression:  Final diagnoses:  [T14.90XA] Trauma  [S00.93XA] Contusion of head, unspecified part of head, initial encounter (Primary)  [S80.02XA] Contusion of left knee, initial encounter          ED Disposition Condition    Discharge Stable          ED Prescriptions       Medication Sig Dispense Start Date End Date Auth. Provider    traMADoL (ULTRAM) 50 mg tablet Take 1 tablet (50 mg total) by mouth every 6 (six) hours as needed for Pain. 15 tablet 12/31/2023 -- Bernardino Schwartz MD          Follow-up Information       Follow up With Specialties Details Why Contact Info    Merlin Dennison MD Family Medicine Call  As needed [address] [id number]  [phone]               Bernardino Schwartz MD  12/31/23 4448

## 2024-03-18 PROBLEM — N39.0 URINARY TRACT INFECTION WITH HEMATURIA: Status: RESOLVED | Noted: 2023-12-14 | Resolved: 2024-03-18

## 2024-03-18 PROBLEM — R31.9 URINARY TRACT INFECTION WITH HEMATURIA: Status: RESOLVED | Noted: 2023-12-14 | Resolved: 2024-03-18

## 2025-01-25 ENCOUNTER — HOSPITAL ENCOUNTER (EMERGENCY)
Facility: HOSPITAL | Age: 80
Discharge: HOME | End: 2025-01-25
Payer: MEDICARE

## 2025-01-25 VITALS
TEMPERATURE: 98 F | WEIGHT: 205 LBS | HEART RATE: 64 BPM | RESPIRATION RATE: 19 BRPM | OXYGEN SATURATION: 99 % | SYSTOLIC BLOOD PRESSURE: 126 MMHG | HEIGHT: 59 IN | DIASTOLIC BLOOD PRESSURE: 69 MMHG | BODY MASS INDEX: 41.33 KG/M2

## 2025-01-25 DIAGNOSIS — R07.9 CHEST PAIN: ICD-10-CM

## 2025-01-25 DIAGNOSIS — R07.89 ATYPICAL CHEST PAIN: Primary | ICD-10-CM

## 2025-01-25 LAB
ALBUMIN SERPL-MCNC: 3.9 G/DL (ref 3.4–5)
ALBUMIN/GLOB SERPL: 1.6 RATIO
ALP SERPL-CCNC: 59 UNIT/L (ref 50–144)
ALT SERPL-CCNC: 28 UNIT/L (ref 1–45)
ANION GAP SERPL CALC-SCNC: 5 MEQ/L (ref 2–13)
AST SERPL-CCNC: 37 UNIT/L (ref 14–36)
BASOPHILS # BLD AUTO: 0.05 X10(3)/MCL (ref 0.01–0.08)
BASOPHILS NFR BLD AUTO: 0.6 % (ref 0.1–1.2)
BILIRUB SERPL-MCNC: 0.7 MG/DL (ref 0–1)
BNP BLD-MCNC: 73.9 PG/ML (ref 0–124.9)
BUN SERPL-MCNC: 12 MG/DL (ref 7–20)
CALCIUM SERPL-MCNC: 10.2 MG/DL (ref 8.4–10.2)
CHLORIDE SERPL-SCNC: 105 MMOL/L (ref 98–110)
CO2 SERPL-SCNC: 23 MMOL/L (ref 21–32)
CREAT SERPL-MCNC: 0.59 MG/DL (ref 0.66–1.25)
CREAT/UREA NIT SERPL: 20 (ref 12–20)
D DIMER PPP IA.FEU-MCNC: 0.58 MG/L (ref 0.19–0.5)
EOSINOPHIL # BLD AUTO: 0.27 X10(3)/MCL (ref 0.04–0.36)
EOSINOPHIL NFR BLD AUTO: 3.3 % (ref 0.7–7)
ERYTHROCYTE [DISTWIDTH] IN BLOOD BY AUTOMATED COUNT: 11.9 % (ref 11–14.5)
GFR SERPLBLD CREATININE-BSD FMLA CKD-EPI: >90 ML/MIN/1.73/M2
GLOBULIN SER-MCNC: 2.5 GM/DL (ref 2–3.9)
GLUCOSE SERPL-MCNC: 235 MG/DL (ref 70–115)
HCT VFR BLD AUTO: 41.9 % (ref 36–48)
HGB BLD-MCNC: 14.7 G/DL (ref 11.8–16)
IMM GRANULOCYTES # BLD AUTO: 0.03 X10(3)/MCL (ref 0–0.03)
IMM GRANULOCYTES NFR BLD AUTO: 0.4 % (ref 0–0.5)
LYMPHOCYTES # BLD AUTO: 3.38 X10(3)/MCL (ref 1.16–3.74)
LYMPHOCYTES NFR BLD AUTO: 41.3 % (ref 20–55)
MCH RBC QN AUTO: 33.3 PG (ref 27–34)
MCHC RBC AUTO-ENTMCNC: 35.1 G/DL (ref 31–37)
MCV RBC AUTO: 95 FL (ref 79–99)
MONOCYTES # BLD AUTO: 0.59 X10(3)/MCL (ref 0.24–0.36)
MONOCYTES NFR BLD AUTO: 7.2 % (ref 4.7–12.5)
NEUTROPHILS # BLD AUTO: 3.87 X10(3)/MCL (ref 1.56–6.13)
NEUTROPHILS NFR BLD AUTO: 47.2 % (ref 37–73)
NRBC BLD AUTO-RTO: 0 %
PLATELET # BLD AUTO: 194 X10(3)/MCL (ref 140–371)
PMV BLD AUTO: 9.1 FL (ref 9.4–12.4)
POTASSIUM SERPL-SCNC: 3.7 MMOL/L (ref 3.5–5.1)
PROT SERPL-MCNC: 6.4 GM/DL (ref 6.3–8.2)
RBC # BLD AUTO: 4.41 X10(6)/MCL (ref 4–5.1)
SODIUM SERPL-SCNC: 133 MMOL/L (ref 136–145)
TROPONIN I SERPL-MCNC: <0.012 NG/ML (ref 0–0.03)
WBC # BLD AUTO: 8.19 X10(3)/MCL (ref 4–11.5)

## 2025-01-25 PROCEDURE — 80053 COMPREHEN METABOLIC PANEL: CPT

## 2025-01-25 PROCEDURE — 93005 ELECTROCARDIOGRAM TRACING: CPT

## 2025-01-25 PROCEDURE — 99285 EMERGENCY DEPT VISIT HI MDM: CPT | Mod: 25

## 2025-01-25 PROCEDURE — 85379 FIBRIN DEGRADATION QUANT: CPT

## 2025-01-25 PROCEDURE — 93010 ELECTROCARDIOGRAM REPORT: CPT | Mod: ,,, | Performed by: INTERNAL MEDICINE

## 2025-01-25 PROCEDURE — 83880 ASSAY OF NATRIURETIC PEPTIDE: CPT

## 2025-01-25 PROCEDURE — 84484 ASSAY OF TROPONIN QUANT: CPT

## 2025-01-25 PROCEDURE — 85025 COMPLETE CBC W/AUTO DIFF WBC: CPT

## 2025-01-26 NOTE — ED PROVIDER NOTES
Encounter Date: 1/25/2025       History     Chief Complaint   Patient presents with    Chest Pain     PRESENTS TO ED PER [facility] FROM HOME WITH C/O SHARP, MID-STERNAL CHEST PAIN AND SOB ONSET @ 1715. STATES TOOK X3 NITRO SL AT HOME WITH RELIEF UPON ARRIVAL.     79-year-old female presents via EMS with complaints of chest pain.  The pain hit her suddenly at 7:15 a.m..  It was left parasternal and sharp.  There was some mild shortness of breath with it.  She took a couple of nitros, and the pain is now resolved.  She denies any vomiting or diaphoresis.  She is on no blood thinners.    The history is provided by the patient and the EMS personnel.     Review of patient's allergies indicates:   Allergen Reactions    Ace inhibitors Swelling    Benazepril Swelling    Duricef [cefadroxil] Swelling    Iodine Swelling    Omnipen Swelling    Penicillins Swelling    Vistaril [hydroxyzine hcl] Swelling    Wasp venom      Past Medical History:   Diagnosis Date    Arthritis     Depression     Diabetes mellitus, type 2     Hypertension      Past Surgical History:   Procedure Laterality Date    APPENDECTOMY      EYE SURGERY      HYSTERECTOMY      TOTAL KNEE ARTHROPLASTY       Family History   Problem Relation Name Age of Onset    Heart attack Mother      Hypertension Mother      Heart failure Mother      Diabetes type II Mother      Inflammatory bowel disease Father       Social History     Tobacco Use    Smoking status: Never    Smokeless tobacco: Never   Substance Use Topics    Alcohol use: Never    Drug use: Never     Review of Systems   Constitutional:  Negative for fever.   HENT:  Negative for sore throat.    Respiratory:  Positive for shortness of breath.    Cardiovascular:  Positive for chest pain.   Gastrointestinal:  Negative for nausea.   Genitourinary:  Negative for dysuria.   Musculoskeletal:  Negative for back pain.   Skin:  Negative for rash.   Neurological:  Negative for weakness.   Hematological:  Does not  bruise/bleed easily.   Psychiatric/Behavioral:  The patient is nervous/anxious.    All other systems reviewed and are negative.      Physical Exam     Initial Vitals [01/25/25 1802]   BP Pulse Resp Temp SpO2   121/78 64 18 98.1 °F (36.7 °C) 97 %      MAP       --         Physical Exam    Nursing note and vitals reviewed.  Constitutional: Vital signs are normal. She appears well-developed and well-nourished. She is cooperative.   HENT:   Head: Normocephalic and atraumatic. Mouth/Throat: Oropharynx is clear and moist.   Eyes: Conjunctivae, EOM and lids are normal. Pupils are equal, round, and reactive to light.   Neck: Trachea normal. Neck supple.   Normal range of motion.  Cardiovascular:  Regular rhythm, normal heart sounds and intact distal pulses.           Pulmonary/Chest: Breath sounds normal.   Abdominal: Abdomen is soft. Bowel sounds are normal.   Musculoskeletal:         General: Normal range of motion.      Cervical back: Normal, normal range of motion and neck supple.      Lumbar back: Normal.     Neurological: She is alert and oriented to person, place, and time. She has normal strength. Coordination normal. GCS score is 15. GCS eye subscore is 4. GCS verbal subscore is 5. GCS motor subscore is 6.   Skin: Skin is warm, dry and intact. Capillary refill takes less than 2 seconds.   Psychiatric: She has a normal mood and affect. Her speech is normal and behavior is normal. Judgment and thought content normal. Cognition and memory are normal.         ED Course   Procedures  Labs Reviewed   COMPREHENSIVE METABOLIC PANEL - Abnormal       Result Value    Sodium 133 (*)     Potassium 3.7      Chloride 105      CO2 23      Glucose 235 (*)     Blood Urea Nitrogen 12      Creatinine 0.59 (*)     Calcium 10.2      Protein Total 6.4      Albumin 3.9      Globulin 2.5      Albumin/Globulin Ratio 1.6      Bilirubin Total 0.7      ALP 59      ALT 28      AST 37 (*)     eGFR >90      Anion Gap 5.0      BUN/Creatinine Ratio  20     CBC WITH DIFFERENTIAL - Abnormal    WBC 8.19      RBC 4.41      Hgb 14.7      Hct 41.9      MCV 95.0      MCH 33.3      MCHC 35.1      RDW 11.9      Platelet 194      MPV 9.1 (*)     Neut % 47.2      Lymph % 41.3      Mono % 7.2      Eos % 3.3      Basophil % 0.6      Lymph # 3.38      Neut # 3.87      Mono # 0.59 (*)     Eos # 0.27      Baso # 0.05      Imm Gran # 0.03      Imm Grans % 0.4      NRBC% 0.0     D DIMER, QUANTITATIVE - Abnormal    D-Dimer 0.58 (*)    TROPONIN I - Normal    Troponin-I <0.012     NT-PRO NATRIURETIC PEPTIDE - Normal    ProBNP 73.9     CBC W/ AUTO DIFFERENTIAL    Narrative:     The following orders were created for panel order CBC auto differential.  Procedure                               Abnormality         Status                     ---------                               -----------         ------                     CBC with Differential[[id number]]       Abnormal            Final result                 Please view results for these tests on the individual orders.        ECG Results              EKG 12-lead (Preliminary result)  Result time 01/25/25 18:12:33      Wet Read by Pierre Deleon MD (01/25/25 18:12:33, [facility], Emergency Medicine)    Normal sinus rhythm, heart rate 62, normal intervals, normal axis, normal P waves, normal QRS, normal ST segments, normal T-waves, normal QT.                                  Imaging Results              X-Ray Chest PA And Lateral (Preliminary result)  Result time 01/25/25 19:10:57      Wet Read by Pierre Deleon MD (01/25/25 19:10:57, [facility], Emergency Medicine)    Normal Chest x-ray                                     Medications - No data to display  Medical Decision Making  Atypical chest pain, now resolved, sudden-onset  Differential diagnosis: PE, ACS, musculoskeletal chest pain, indigestion  Cardiac workup, D-dimer    Amount and/or Complexity of Data  Reviewed  Labs: ordered. Decision-making details documented in ED Course.  Radiology: ordered and independent interpretation performed.               ED Course as of 01/25/25 1912   Sat Jan 25, 2025 1854 D dimer, quantitative(!)  Age adjusted normal [TM]      ED Course User Index  [TM] Pierre Deleon MD                           Clinical Impression:  Final diagnoses:  [R07.9] Chest pain  [R07.89] Atypical chest pain (Primary)          ED Disposition Condition    Discharge Good          ED Prescriptions    None       Follow-up Information       Follow up With Specialties Details Why Contact Info    Merlin Dennison MD Family Medicine Call in 2 days  [address] [facility]  [place] [address]  [phone]               Pierre Deleon MD  01/25/25 1855       Pierre Deleon MD  01/25/25 1912

## 2025-01-27 LAB
OHS QRS DURATION: 92 MS
OHS QTC CALCULATION: 436 MS